# Patient Record
Sex: FEMALE | Race: WHITE | NOT HISPANIC OR LATINO | URBAN - METROPOLITAN AREA
[De-identification: names, ages, dates, MRNs, and addresses within clinical notes are randomized per-mention and may not be internally consistent; named-entity substitution may affect disease eponyms.]

---

## 2018-09-24 ENCOUNTER — HOSPITAL ENCOUNTER (OUTPATIENT)
Dept: RADIOLOGY | Age: 18
Discharge: HOME | End: 2018-09-24
Attending: NURSE PRACTITIONER
Payer: COMMERCIAL

## 2018-09-24 ENCOUNTER — TRANSCRIBE ORDERS (OUTPATIENT)
Dept: RADIOLOGY | Age: 18
End: 2018-09-24

## 2018-09-24 DIAGNOSIS — J21.9 ACUTE BRONCHIOLITIS: ICD-10-CM

## 2018-09-24 DIAGNOSIS — J21.9 ACUTE BRONCHIOLITIS: Primary | ICD-10-CM

## 2018-09-24 DIAGNOSIS — J41.0 SIMPLE CHRONIC BRONCHITIS (CMS/HCC): ICD-10-CM

## 2018-09-24 PROCEDURE — 71046 X-RAY EXAM CHEST 2 VIEWS: CPT

## 2019-07-08 ENCOUNTER — HOSPITAL ENCOUNTER (EMERGENCY)
Facility: HOSPITAL | Age: 19
Discharge: HOME | End: 2019-07-08
Attending: EMERGENCY MEDICINE
Payer: COMMERCIAL

## 2019-07-08 VITALS
HEART RATE: 66 BPM | RESPIRATION RATE: 18 BRPM | DIASTOLIC BLOOD PRESSURE: 95 MMHG | SYSTOLIC BLOOD PRESSURE: 145 MMHG | TEMPERATURE: 97.7 F | OXYGEN SATURATION: 100 % | HEIGHT: 66 IN | BODY MASS INDEX: 19.29 KG/M2 | WEIGHT: 120 LBS

## 2019-07-08 DIAGNOSIS — S06.0X0A CONCUSSION WITHOUT LOSS OF CONSCIOUSNESS, INITIAL ENCOUNTER: Primary | ICD-10-CM

## 2019-07-08 PROCEDURE — 99281 EMR DPT VST MAYX REQ PHY/QHP: CPT

## 2019-07-08 RX ORDER — CETIRIZINE HYDROCHLORIDE 10 MG/1
10 TABLET ORAL DAILY
COMMUNITY

## 2019-07-08 RX ORDER — LEVOTHYROXINE SODIUM 50 UG/1
50 TABLET ORAL DAILY
COMMUNITY
Start: 2018-08-23

## 2019-07-08 ASSESSMENT — ENCOUNTER SYMPTOMS
FEVER: 0
VOMITING: 0
PHOTOPHOBIA: 1
NUMBNESS: 0
CHILLS: 0
FACIAL ASYMMETRY: 0
LIGHT-HEADEDNESS: 0
WEAKNESS: 0

## 2019-07-09 NOTE — ED PROVIDER NOTES
HPI     Chief Complaint   Patient presents with   • Head Injury       Patient reports that June 14 she walked into a corner striking her forehead did not fall to the ground.  She states she had a small bruise overlying her left forehead.  She states about a week later she rolled out of her dorm room bed about 4 in the morning she feels she may have hit her head at that time as well.  Since that time she has had headaches that come and go worse with reading on the computer and working better with rest.  She gets some relief with Tylenol.  She states she is concerned she may have a concussion.  She denies any loss of consciousness she is been nauseated has not vomited.  She denies any visual change no double vision or blurred vision.  She denies any paresthesias numbness tingling weakness to her extremities.  Patient states that her symptoms have continued she decided to come to the emergency room today for evaluation.    Patient states she has not had a concussion or head injury quite like this before she is concerned.  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             Patient History     Past Medical History:   Diagnosis Date   • Hypothyroid    • Migraines    • POTS (postural orthostatic tachycardia syndrome)    • Seasonal allergies        Past Surgical History:   Procedure Laterality Date   • TONGUE FLAP RELEASE         History reviewed. No pertinent family history.    Social History   Substance Use Topics   • Smoking status: Never Smoker   • Smokeless tobacco: Never Used   • Alcohol use No       Systems Reviewed from Nursing Triage:          Review of Systems     Review of Systems   Constitutional: Negative for chills and fever.   Eyes: Positive for photophobia. Negative for visual disturbance.   Gastrointestinal: Negative for vomiting.   Neurological: Negative for facial asymmetry, weakness, light-headedness and numbness.   All other systems reviewed and are negative.       Physical  "Exam     ED Triage Vitals [07/08/19 1744]   Temp Heart Rate Resp BP SpO2   36.5 °C (97.7 °F) 66 18 (!) 145/95 100 %      Temp Source Heart Rate Source Patient Position BP Location FiO2 (%) (Set)   Tympanic Monitor Sitting Right upper arm --                     Patient Vitals for the past 24 hrs:   BP Temp Temp src Pulse Resp SpO2 Height Weight   07/08/19 1744 (!) 145/95 36.5 °C (97.7 °F) Tympanic 66 18 100 % 1.676 m (5' 6\") 54.4 kg (120 lb)           Physical Exam   Constitutional: She is oriented to person, place, and time. She appears well-developed.   HENT:   Head: Normocephalic and atraumatic.   Right Ear: External ear normal.   Left Ear: External ear normal.   There is no beltran sign there is no raccoons eyes there is no hemotympanum   Eyes: Pupils are equal, round, and reactive to light. Conjunctivae and EOM are normal.   Neck: Normal range of motion. Neck supple.   Cardiovascular: Normal rate, regular rhythm and normal heart sounds.    Musculoskeletal: Normal range of motion.   Neurological: She is alert and oriented to person, place, and time. She displays normal reflexes. No cranial nerve deficit. Coordination normal.   Skin: Skin is warm.   Psychiatric: She has a normal mood and affect.   Vitals reviewed.           Procedures    ED Course & MDM     Labs Reviewed - No data to display    No orders to display               MDM         Clinical Impressions as of Jul 08 2311   Concussion without loss of consciousness, initial encounter        Harris Orozco PA C  07/08/19 2311    "

## 2019-07-09 NOTE — ED ATTESTATION NOTE
I have personally seen, evaluated,and participated in the management of Dionne Lombardi.  I reviewed and agree with the PA/NP's assessment and plan of care with the following exceptions: None    My examination, assessment, and plan of care for Dionne Lombardi:  18-year-old female [employer] student presented with checkup for possible concussion and possible imaging required.  Patient stated that she had a head injury in the left frontal scalp approximately 3 weeks ago followed by another injury the day after.  However approximately 1 week afterwards she rolled out of bed and struck her head against a trash can.  Stated that she has had since suffer from intermittent nausea and trouble, with concentration.  No vomiting.  Exam:  Awake and alert normal clear speech without respiratory distress.  No facial symmetry.  Moving all 4 extremities spontaneously  Plan:  Concussion follow-up and instructions     Nicholas Greenwood MD  07/08/19 2037

## 2020-01-09 ENCOUNTER — PREPPED CHART (OUTPATIENT)
Dept: URBAN - METROPOLITAN AREA CLINIC 94 | Facility: CLINIC | Age: 20
End: 2020-01-09

## 2020-01-09 PROBLEM — H52.13 MYOPIA: Noted: 2020-01-09

## 2022-08-29 ENCOUNTER — ESTABLISHED COMPREHENSIVE EXAM (OUTPATIENT)
Dept: URBAN - METROPOLITAN AREA CLINIC 90 | Facility: CLINIC | Age: 22
End: 2022-08-29

## 2022-08-29 DIAGNOSIS — H52.13: ICD-10-CM

## 2022-08-29 PROCEDURE — 92014 COMPRE OPH EXAM EST PT 1/>: CPT

## 2022-08-29 PROCEDURE — 92015 DETERMINE REFRACTIVE STATE: CPT

## 2022-08-29 PROCEDURE — 92310 CONTACT LENS FITTING OU: CPT

## 2022-08-29 ASSESSMENT — TONOMETRY
OD_IOP_MMHG: 15
OS_IOP_MMHG: 15

## 2024-12-30 ENCOUNTER — ESTABLISHED COMPREHENSIVE EXAM (OUTPATIENT)
Dept: URBAN - METROPOLITAN AREA CLINIC 90 | Facility: CLINIC | Age: 24
End: 2024-12-30

## 2024-12-30 DIAGNOSIS — H52.13: ICD-10-CM

## 2024-12-30 PROCEDURE — 92014 COMPRE OPH EXAM EST PT 1/>: CPT

## 2024-12-30 PROCEDURE — 92310 CONTACT LENS FITTING OU: CPT

## 2024-12-30 PROCEDURE — 92015 DETERMINE REFRACTIVE STATE: CPT

## 2024-12-30 ASSESSMENT — TONOMETRY
OS_IOP_MMHG: 18
OD_IOP_MMHG: 18

## 2024-12-30 ASSESSMENT — VISUAL ACUITY
OD_CC: 20/30
OS_CC: 20/20-2